# Patient Record
Sex: MALE | Race: WHITE | NOT HISPANIC OR LATINO
[De-identification: names, ages, dates, MRNs, and addresses within clinical notes are randomized per-mention and may not be internally consistent; named-entity substitution may affect disease eponyms.]

---

## 2020-09-15 PROBLEM — Z00.00 ENCOUNTER FOR PREVENTIVE HEALTH EXAMINATION: Status: ACTIVE | Noted: 2020-09-15

## 2020-09-16 ENCOUNTER — APPOINTMENT (OUTPATIENT)
Dept: NEUROLOGY | Facility: CLINIC | Age: 65
End: 2020-09-16
Payer: COMMERCIAL

## 2020-09-16 VITALS
HEIGHT: 74 IN | HEART RATE: 41 BPM | WEIGHT: 225 LBS | DIASTOLIC BLOOD PRESSURE: 87 MMHG | SYSTOLIC BLOOD PRESSURE: 163 MMHG | BODY MASS INDEX: 28.88 KG/M2

## 2020-09-16 VITALS — SYSTOLIC BLOOD PRESSURE: 162 MMHG | HEART RATE: 45 BPM | DIASTOLIC BLOOD PRESSURE: 89 MMHG

## 2020-09-16 VITALS — TEMPERATURE: 97 F

## 2020-09-16 DIAGNOSIS — G20 PARKINSON'S DISEASE: ICD-10-CM

## 2020-09-16 PROCEDURE — 99205 OFFICE O/P NEW HI 60 MIN: CPT

## 2020-09-16 NOTE — DISCUSSION/SUMMARY
[FreeTextEntry1] : This 65-year-old right-handed male who presents with chief complaints of tremor. His tremors have been long-standing since he was a child, have recently worsened and started interfering with daily activities.\par On exam patient is noted to have resting tremor and bradykinesia right worse than left. He also has other nonmotor symptoms such as constipation, REM behavior disorder, anxiety and altered sense of smell, mild drooling\par His mother and daughter have tremors\par \par Impression - parkinsonism\par \par Plan\par Patient is  overwhelmed at the possible diagnosis of Parkinson's disease. He was under the impression of this being essential tremor\par I had an extensive discussion with him and his wife, regarding the diagnosis, prognosis and possible treatment options\par MRI of the brain and PHILIP scan was discussed, however not ordered today as the patient was not ready for it\par Followup in 6-8 weeks\par All questions were addressed and answered
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
## 2020-09-16 NOTE — PHYSICAL EXAM
[FreeTextEntry1] : Patient has reduced facial expression\par 3 resting tremor in the right upper extremity, none in the left\par Minimal postural tremor right worse than left\par Bilateral bradykinesia\par Hand opening and closing 3 on the right, 2 on the left\par Rapid alternating movements on the right, one on the left\par Finger taps 2 on the right, one on the left\par No rigidity\par Difficulty getting up from the chair\par Slight dragging of the right leg, reduced right arm swing with reemergence resting tremor\par No difficulty with tandem walk\par Strength grossly intact\par Extraocular movements are intact\par spirals are scanned in EHR, small handwriting [General Appearance - Alert] : alert [General Appearance - In No Acute Distress] : in no acute distress [Affect] : the affect was normal [Sensation Tactile Decrease] : light touch was intact [Extraocular Movements] : extraocular movements were intact [Outer Ear] : the ears and nose were normal in appearance [Hearing Threshold Finger Rub Not Evans] : hearing was normal [Neck Appearance] : the appearance of the neck was normal [] : no respiratory distress [Edema] : there was no peripheral edema [Skin Color & Pigmentation] : normal skin color and pigmentation

## 2020-11-03 ENCOUNTER — APPOINTMENT (OUTPATIENT)
Dept: NEUROLOGY | Facility: CLINIC | Age: 65
End: 2020-11-03